# Patient Record
Sex: MALE
[De-identification: names, ages, dates, MRNs, and addresses within clinical notes are randomized per-mention and may not be internally consistent; named-entity substitution may affect disease eponyms.]

---

## 2023-04-26 PROBLEM — Z00.00 ENCOUNTER FOR PREVENTIVE HEALTH EXAMINATION: Status: ACTIVE | Noted: 2023-04-26

## 2023-04-27 ENCOUNTER — APPOINTMENT (OUTPATIENT)
Dept: OTOLARYNGOLOGY | Facility: CLINIC | Age: 58
End: 2023-04-27
Payer: MEDICAID

## 2023-04-27 VITALS — WEIGHT: 175 LBS | BODY MASS INDEX: 23.19 KG/M2 | HEIGHT: 73 IN

## 2023-04-27 DIAGNOSIS — Z78.9 OTHER SPECIFIED HEALTH STATUS: ICD-10-CM

## 2023-04-27 DIAGNOSIS — H61.303 ACQUIRED STENOSIS OF EXTERNAL EAR CANAL, UNSPECIFIED, BILATERAL: ICD-10-CM

## 2023-04-27 DIAGNOSIS — H93.13 TINNITUS, BILATERAL: ICD-10-CM

## 2023-04-27 DIAGNOSIS — H90.3 SENSORINEURAL HEARING LOSS, BILATERAL: ICD-10-CM

## 2023-04-27 PROCEDURE — 92557 COMPREHENSIVE HEARING TEST: CPT

## 2023-04-27 PROCEDURE — 99203 OFFICE O/P NEW LOW 30 MIN: CPT

## 2023-04-27 PROCEDURE — 92567 TYMPANOMETRY: CPT

## 2023-04-27 NOTE — PHYSICAL EXAM
[Midline] : trachea located in midline position [Normal] : no rashes [FreeTextEntry1] : Procedure: Microscopic Ear Exam\par \par Left ear:  \par Ear canal intact without inflammation or lesion.  \par Tympanic membrane intact without inflammation.\par \par \par Right ear:  \par Ear canal intact without inflammation or lesion.  Exostosis noted in the ear canal without obstruction.

## 2023-04-27 NOTE — ASSESSMENT
[FreeTextEntry1] : I have carefully reviewed the etiologies of tinnitus with the patient and have reviewed management options including coping strategies.  I have offered a referral to an audiologist for further evaluation and counseling.\par \par I have reviewed the audiometric findings in detail and the management options.\par I have recommended considering amplification for hearing loss and offered a referral to the [facility].\par \par Noise protection recommended.\par \par Clinical monitoring with repeat audiometry annually and as needed.  I have requested previous medical records including prior audiometry.

## 2023-04-27 NOTE — HISTORY OF PRESENT ILLNESS
[de-identified] : SERGIO JOYCE has a history of hearing loss and tinnitus for 20 or more years.  this significantly increased in the Spring 2022.  Also reports loss of balance at times with motion. 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  Uses protection for hearing now. \par  [FreeTextEntry1] : \par

## 2023-04-27 NOTE — DATA REVIEWED
[de-identified] : In light of the patients current symptoms, Complete audiometry was ordered and completed today. I have interpreted these results and reviewed them in detail with the patient.\par \par Sloping high-frequency hearing loss affecting both ears.  Previous audiometry provided by the patient and reviewed and compared to today's testing.  There does appear to be progression.

## 2023-05-03 PROBLEM — H93.13 TINNITUS OF BOTH EARS: Status: ACTIVE | Noted: 2023-04-27

## 2023-05-03 PROBLEM — H90.3 SENSORINEURAL HEARING LOSS (SNHL) OF BOTH EARS: Status: ACTIVE | Noted: 2023-04-27
